# Patient Record
Sex: MALE | Race: WHITE | NOT HISPANIC OR LATINO | URBAN - METROPOLITAN AREA
[De-identification: names, ages, dates, MRNs, and addresses within clinical notes are randomized per-mention and may not be internally consistent; named-entity substitution may affect disease eponyms.]

---

## 2017-07-05 ENCOUNTER — EMERGENCY (EMERGENCY)
Facility: HOSPITAL | Age: 22
LOS: 0 days | Discharge: HOME | End: 2017-07-05

## 2017-07-05 DIAGNOSIS — R07.89 OTHER CHEST PAIN: ICD-10-CM

## 2021-02-10 ENCOUNTER — EMERGENCY (EMERGENCY)
Facility: HOSPITAL | Age: 26
LOS: 0 days | Discharge: HOME | End: 2021-02-10
Attending: EMERGENCY MEDICINE | Admitting: EMERGENCY MEDICINE
Payer: COMMERCIAL

## 2021-02-10 VITALS
OXYGEN SATURATION: 97 % | RESPIRATION RATE: 18 BRPM | WEIGHT: 190.04 LBS | SYSTOLIC BLOOD PRESSURE: 153 MMHG | DIASTOLIC BLOOD PRESSURE: 86 MMHG | TEMPERATURE: 98 F | HEART RATE: 91 BPM | HEIGHT: 69 IN

## 2021-02-10 DIAGNOSIS — K08.89 OTHER SPECIFIED DISORDERS OF TEETH AND SUPPORTING STRUCTURES: ICD-10-CM

## 2021-02-10 DIAGNOSIS — K02.9 DENTAL CARIES, UNSPECIFIED: ICD-10-CM

## 2021-02-10 PROCEDURE — 99282 EMERGENCY DEPT VISIT SF MDM: CPT

## 2021-02-10 NOTE — CONSULT NOTE ADULT - SUBJECTIVE AND OBJECTIVE BOX
Patient is a 25y old  Male who presents with a chief complaint of lower right side tooth pain     HPI: Patient reports that his lower right tooth has been hurting of the past 2-3 weeks. Pain has been causing headaches. Patient was previously told by dentist that tooth may require root canal therapy. Currently, patient does not have private dentist. Presented to [facility] ED today and was referred to dental medicine for a consult.     PAST MEDICAL & SURGICAL HISTORY:  No pertinent past medical history    MEDICATIONS  (STANDING): none     MEDICATIONS  (PRN): Motrin     Allergies: no known drug allergies     *SOCIAL HISTORY: ( -  ) Tobacco; ( -  ) ETOH    *Last Dental Visit: one year ago, implant #8     Vital Signs Last 24 Hrs  T(C): 36.4 (10 Feb 2021 09:50), Max: 36.4 (10 Feb 2021 09:50)  T(F): 97.6 (10 Feb 2021 09:50), Max: 97.6 (10 Feb 2021 09:50)  HR: 91 (10 Feb 2021 09:50) (91 - 91)  BP: 153/86 (10 Feb 2021 09:50) (153/86 - 153/86)  BP(mean): --  RR: 18 (10 Feb 2021 09:50) (18 - 18)  SpO2: 97% (10 Feb 2021 09:50) (97% - 97%)    Vitals 1110  Temperature 98.7  Blood pressure 110/82    EOE:  TMJ ( -  ) clicks                     ( -  ) pops                     ( -  ) crepitus             Mandible <<FROM>>             Facial bones and MOM <<grossly intact>>             ( -  ) trismus             ( -  ) lymphadenopathy             ( -  ) swelling             ( -  ) asymmetry             ( -  ) palpation             ( -  ) dyspnea             ( -  ) dysphagia             ( -  ) loss of consciousness    IOE:  <<permanent>> dentition: <<grossly intact>>            hard/soft palate:  ( -  ) palatal torus, <<No pathology noted>>           tongue/FOM <<No pathology noted>>           labial/buccal mucosa <<No pathology noted>>           ( -  ) percussion           ( -  ) palpation           ( -  ) swelling            ( -  ) abscess           ( -  ) sinus tract    *DENTAL RADIOGRAPHS: 1 periapical, 1 panoramic     *ASSESSMENT: #30 existing occlusal restoration, periapical pathology mesial root     *PLAN: Treatment options, risks and benefits discussed with patient. Told patient that tooth is restorable, able to be saved with root canal therapy and final restoration. Patient understood; wants to save tooth upon discussion with mother. Gave patient recommendations for endodontist. Prescribed Amoxicillin 500mg Disp 21 Sig 1 tab q8hrs x 7 days.     RECOMMENDATIONS:  1) << Amoxicillin 500mg Disp 21 Sig 1 tab q8hrs x 7 days   >>  2) Dental F/U with outpatient dentist for comprehensive dental care.   3) If any difficulty swallowing/breathing, fever occur, return to ER.     Resident Name, pager #  Tera Walker, LEIJAHS   [phone]

## 2021-02-10 NOTE — ED PROVIDER NOTE - OBJECTIVE STATEMENT
24 y/o male presents to the ED c/o "I have right lower dental pain for 2-3 weeks. I take Motrin for pain." no fever/ chills/ difficulty swallowing

## 2021-02-10 NOTE — ED PROVIDER NOTE - ATTENDING CONTRIBUTION TO CARE
25yoM prev healthy presents with R lower dental pain x 2-3 wks in area where he was told in the past by dentist that will likely need root canal. Has no current dentist. Denies fever, trouble swallowing or breathing, and all other symptoms. On exam, afebrile, hemodynamically stable, saturating well, NAD, well appearing, sitting comfortably in chair, head NCAT, EOMI grossly, anicteric, MMM, noted central blackening to R lower tooth, no surrounding swelling, no facial swelling, breathing comfortably on RA, AAO, CN's 3-12 grossly intact, SOLOMON spontaneously, skin warm, well perfused, no rashes or hives. No e/o airway or systemic involvement. C/w dentalgia. Sent to dental clinic.